# Patient Record
Sex: MALE
[De-identification: names, ages, dates, MRNs, and addresses within clinical notes are randomized per-mention and may not be internally consistent; named-entity substitution may affect disease eponyms.]

---

## 2020-01-18 ENCOUNTER — TRANSCRIPTION ENCOUNTER (OUTPATIENT)
Age: 25
End: 2020-01-18

## 2021-11-22 ENCOUNTER — TRANSCRIPTION ENCOUNTER (OUTPATIENT)
Age: 26
End: 2021-11-22

## 2023-06-04 ENCOUNTER — NON-APPOINTMENT (OUTPATIENT)
Age: 28
End: 2023-06-04

## 2024-12-13 ENCOUNTER — EMERGENCY (EMERGENCY)
Facility: HOSPITAL | Age: 29
LOS: 1 days | Discharge: HOME | End: 2024-12-13
Attending: EMERGENCY MEDICINE
Payer: COMMERCIAL

## 2024-12-13 VITALS
OXYGEN SATURATION: 99 % | RESPIRATION RATE: 18 BRPM | SYSTOLIC BLOOD PRESSURE: 132 MMHG | DIASTOLIC BLOOD PRESSURE: 88 MMHG | TEMPERATURE: 98 F | HEART RATE: 94 BPM

## 2024-12-13 VITALS
TEMPERATURE: 97 F | HEART RATE: 91 BPM | RESPIRATION RATE: 16 BRPM | DIASTOLIC BLOOD PRESSURE: 85 MMHG | SYSTOLIC BLOOD PRESSURE: 129 MMHG | OXYGEN SATURATION: 99 %

## 2024-12-13 PROCEDURE — 99284 EMERGENCY DEPT VISIT MOD MDM: CPT

## 2024-12-13 PROCEDURE — 73030 X-RAY EXAM OF SHOULDER: CPT | Mod: 26,LT,76

## 2024-12-13 PROCEDURE — 96375 TX/PRO/DX INJ NEW DRUG ADDON: CPT | Mod: XU

## 2024-12-13 PROCEDURE — 99285 EMERGENCY DEPT VISIT HI MDM: CPT | Mod: 57

## 2024-12-13 PROCEDURE — 96374 THER/PROPH/DIAG INJ IV PUSH: CPT | Mod: XU

## 2024-12-13 PROCEDURE — 73020 X-RAY EXAM OF SHOULDER: CPT | Mod: 26,59,LT

## 2024-12-13 PROCEDURE — 99284 EMERGENCY DEPT VISIT MOD MDM: CPT | Mod: 25

## 2024-12-13 PROCEDURE — 23650 CLTX SHO DSLC W/MNPJ WO ANES: CPT | Mod: LT

## 2024-12-13 PROCEDURE — 73060 X-RAY EXAM OF HUMERUS: CPT | Mod: 26,LT

## 2024-12-13 PROCEDURE — 73020 X-RAY EXAM OF SHOULDER: CPT

## 2024-12-13 PROCEDURE — 23650 CLTX SHO DSLC W/MNPJ WO ANES: CPT | Mod: 54,LT

## 2024-12-13 PROCEDURE — 73030 X-RAY EXAM OF SHOULDER: CPT

## 2024-12-13 PROCEDURE — 99053 MED SERV 10PM-8AM 24 HR FAC: CPT

## 2024-12-13 PROCEDURE — 73060 X-RAY EXAM OF HUMERUS: CPT

## 2024-12-13 RX ORDER — KETOROLAC TROMETHAMINE 30 MG/ML
15 INJECTION INTRAMUSCULAR; INTRAVENOUS ONCE
Refills: 0 | Status: DISCONTINUED | OUTPATIENT
Start: 2024-12-13 | End: 2024-12-13

## 2024-12-13 RX ORDER — FENTANYL 12 UG/H
50 PATCH, EXTENDED RELEASE TRANSDERMAL ONCE
Refills: 0 | Status: DISCONTINUED | OUTPATIENT
Start: 2024-12-13 | End: 2024-12-13

## 2024-12-13 RX ORDER — LIDOCAINE HCL 20 MG/ML
10 VIAL (ML) INJECTION ONCE
Refills: 0 | Status: DISCONTINUED | OUTPATIENT
Start: 2024-12-13 | End: 2024-12-17

## 2024-12-13 RX ADMIN — KETOROLAC TROMETHAMINE 15 MILLIGRAM(S): 30 INJECTION INTRAMUSCULAR; INTRAVENOUS at 23:52

## 2024-12-13 RX ADMIN — FENTANYL 50 MICROGRAM(S): 12 PATCH, EXTENDED RELEASE TRANSDERMAL at 22:33

## 2024-12-13 NOTE — ED PROVIDER NOTE - PHYSICAL EXAMINATION
NAD. VSS. Afebrile. Neck supple. Lungs clear. No spinal tender. No chest wall, rib, or cva tender. +Left shoulder: deform, generalized tender, and diminished ROMs. N/V- intact. No focal neuro deficit,

## 2024-12-13 NOTE — ED PROVIDER NOTE - OBJECTIVE STATEMENT
30yo male, no PMHx was brought to ED by EMS c/o left dominant shoulder pain s/p mechanical fall this evening. Pt was horsing around family at home when he running and fell on both arms. Denies LOC, head injury, or other injuries. Denies h/o shoulder dislocation.

## 2024-12-13 NOTE — ED ADULT TRIAGE NOTE - RESPIRATORY RATE (BREATHS/MIN)
[facility] called to let the doctor know that her blood sugar was 529 this morning.    Please give them a call back    Thanks    
Called Consuelo with [facility], informed her to contact primary care office for patient since Xuan Salazar is out for the week. Consuelo understood.  
16

## 2024-12-13 NOTE — ED ADULT NURSE NOTE - NSFALLHARMRISKINTERV_ED_ALL_ED

## 2024-12-13 NOTE — ED PROVIDER NOTE - PATIENT PORTAL LINK FT
You can access the FollowMyHealth Patient Portal offered by [facility] by registering at the following website: [email or web address]. By joining [facility]’s FollowMyHealth portal, you will also be able to view your health information using other applications (apps) compatible with our system.

## 2024-12-13 NOTE — ED PROVIDER NOTE - NSFOLLOWUPINSTRUCTIONS_ED_ALL_ED_FT
Please see the information of Shoulder Dislocation.    Ice to pain area; every 2hours for 20minutes.    Keep the shoulder immobilizer as instructed.    Take Ibuprofen (600mg every 8hours with food) or Tylenol (2 tablets of 500mg every 8hurs) as needed for pain.    Follow up with orthopedist Dr. Otoole for reevaluation, call Monday for appointment.    Return for any concerns, fever, numbness, weakness, or worsening pain.

## 2024-12-13 NOTE — ED PROVIDER NOTE - ATTENDING APP SHARED VISIT CONTRIBUTION OF CARE
Patient was horsing around family at home when he was running and fell forward onto his left shoulder unclear exactly what happened but had significant sudden pain to his left shoulder unable to range his arm defect noted to the left shoulder region no other injuries denies head injury or LOC no prior orthopedic history IV was placed for analgesia as he is in a lot of pain x-rays ordered of left shoulder rule out shoulder dislocation vs  fracture.

## 2024-12-13 NOTE — ED PROVIDER NOTE - CARE PROVIDER_API CALL
Wagner Otoole  Orthopaedic Surgery  [address] [place] [address], [facility] [[email or web address]] [address]  [place] [address]  Phone: [phone]  Fax: [phone]  Follow Up Time:

## 2024-12-13 NOTE — ED ADULT NURSE NOTE - OBJECTIVE STATEMENT
30y/o M coming to the ED s.p fall. Pt states he was playing with his sister and he fell landing forward, now co L shoulder pain. On exam, shoulder noted to be dislocated, pt co numbness to L arm. Positive peripheral pulses. Pt denies hitting head, LOC, or blood thinners.

## 2024-12-22 PROBLEM — Z00.00 ENCOUNTER FOR PREVENTIVE HEALTH EXAMINATION: Status: ACTIVE | Noted: 2024-12-22
